# Patient Record
Sex: FEMALE | Race: WHITE | NOT HISPANIC OR LATINO | Employment: FULL TIME | URBAN - METROPOLITAN AREA
[De-identification: names, ages, dates, MRNs, and addresses within clinical notes are randomized per-mention and may not be internally consistent; named-entity substitution may affect disease eponyms.]

---

## 2022-01-21 ENCOUNTER — APPOINTMENT (OUTPATIENT)
Dept: WOMENS IMAGING | Facility: HOSPITAL | Age: 57
End: 2022-01-21

## 2022-01-21 PROCEDURE — 77067 SCR MAMMO BI INCL CAD: CPT | Performed by: RADIOLOGY

## 2022-08-15 RX ORDER — PAROXETINE HYDROCHLORIDE 20 MG/1
TABLET, FILM COATED ORAL
Qty: 270 TABLET | Refills: 0 | Status: SHIPPED | OUTPATIENT
Start: 2022-08-15 | End: 2022-12-07 | Stop reason: SDUPTHER

## 2022-10-25 ENCOUNTER — OFFICE VISIT (OUTPATIENT)
Dept: FAMILY MEDICINE CLINIC | Facility: CLINIC | Age: 57
End: 2022-10-25

## 2022-10-25 VITALS
DIASTOLIC BLOOD PRESSURE: 82 MMHG | BODY MASS INDEX: 35.56 KG/M2 | HEIGHT: 65 IN | SYSTOLIC BLOOD PRESSURE: 132 MMHG | HEART RATE: 89 BPM | WEIGHT: 213.4 LBS | OXYGEN SATURATION: 99 %

## 2022-10-25 DIAGNOSIS — R07.9 CHEST PAIN, UNSPECIFIED TYPE: Primary | ICD-10-CM

## 2022-10-25 PROCEDURE — 99213 OFFICE O/P EST LOW 20 MIN: CPT | Performed by: FAMILY MEDICINE

## 2022-10-25 RX ORDER — OMEPRAZOLE 40 MG/1
CAPSULE, DELAYED RELEASE ORAL
COMMUNITY
Start: 2022-08-15 | End: 2022-12-08

## 2022-10-25 RX ORDER — METHYLPREDNISOLONE 4 MG/1
TABLET ORAL
Qty: 19 TABLET | Refills: 0 | Status: SHIPPED | OUTPATIENT
Start: 2022-10-25 | End: 2022-11-04

## 2022-10-25 RX ORDER — FLUTICASONE PROPIONATE 50 MCG
SPRAY, SUSPENSION (ML) NASAL
COMMUNITY
Start: 2022-08-15 | End: 2022-12-07 | Stop reason: SDUPTHER

## 2022-10-25 NOTE — PROGRESS NOTES
"Chief Complaint  pain under armpit  (left)    Subjective          Margot Sanchez presents to [facility]  History of Present Illness  Patient reports has been having some pain under her left axilla from her anterior upper chest to her left axilla she says it comes and goes she says its unusual tingling burning type pain that comes and goes she says she presses and sometimes it hurts and other times it does not she says she does not know she had a mammogram done approximately 10 months ago was reported as normal she has noticed some nodularity of her breast tissue but no masses or thing particularly she was recently diagnosed with 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    Review of Systems   Constitutional: Negative.    HENT: Negative for congestion, dental problem, ear discharge, ear pain and sore throat.    Respiratory: Negative for apnea and shortness of breath.    Gastrointestinal: Negative for constipation and nausea.   Endocrine: Negative for polyuria.   Genitourinary: Negative for difficulty urinating.   Musculoskeletal: Positive for arthralgias and neck pain. Negative for gait problem.   Skin: Negative for rash.   Hematological: Negative for adenopathy.       Past History:  Medical History: has a past medical history of Depression, GERD (gastroesophageal reflux disease), Hypertension, Migraine, Pregnancy, and Tonsillitis.   Surgical History: has a past surgical history that includes Tubal ligation; Tonsillectomy; and Hysterectomy.         Current Outpatient Medications:   •  fluticasone (FLONASE) 50 MCG/ACT nasal spray, , Disp: , Rfl:   •  omeprazole (priLOSEC) 40 MG capsule, , Disp: , Rfl:   •  PARoxetine (PAXIL) 20 MG tablet, TAKE THREE TABLETS BY MOUTH DAILY, Disp: " 270 tablet, Rfl: 0    Allergies: Codeine and Penicillins    Physical Exam  Vitals reviewed.   Constitutional:       Appearance: Normal appearance.   HENT:      Head: Normocephalic and atraumatic.      Right Ear: Tympanic membrane, ear canal and external ear normal.      Left Ear: Tympanic membrane, ear canal and external ear normal.      Nose: Nose normal.      Mouth/Throat:      Mouth: Mucous membranes are moist.   Eyes:      Extraocular Movements: Extraocular movements intact.      Conjunctiva/sclera: Conjunctivae normal.      Pupils: Pupils are equal, round, and reactive to light.   Cardiovascular:      Rate and Rhythm: Normal rate and regular rhythm.   Pulmonary:      Effort: Pulmonary effort is normal.      Breath sounds: Normal breath sounds.   Chest:      Comments: Tenderness of the left axilla no focal focal masses or anything appreciated she also has some tenderness of the costochondral cartilages  Abdominal:      General: Abdomen is flat. Bowel sounds are normal.      Palpations: Abdomen is soft.   Musculoskeletal:         General: Normal range of motion.   Feet:      Right foot:      Protective Sensation: 0 sites tested. 0 sites sensed.      Toenail Condition: Right toenails are normal.      Left foot:      Protective Sensation: 0 sites tested. 0 sites sensed.      Toenail Condition: Left toenails are normal.   Skin:     General: Skin is warm and dry.   Neurological:      General: No focal deficit present.      Mental Status: She is alert and oriented to person, place, and time. Mental status is at baseline.   Psychiatric:         Behavior: Behavior normal.         Thought Content: Thought content normal.         Judgment: Judgment normal.          Result Review :                   Assessment and Plan    Diagnoses and all orders for this visit:    1. Chest pain, unspecified type (Primary)  Comments:  Reviewed mammogram also no masses appreciated it appears this is muscular we will try some Medrol Dosepak  and see how she does and monitor if worsens return     Other orders  -     methylPREDNISolone (MEDROL) 4 MG tablet; Take 3 tablets by mouth Daily for 3 days, THEN 2 tablets Daily for 3 days, THEN 1 tablet Daily for 4 days.  Dispense: 19 tablet; Refill: 0              Follow Up   No follow-ups on file.  Patient was given instructions and counseling regarding her condition or for health maintenance advice. Please see specific information pulled into the AVS if appropriate.     Matthew Corbett MD  Answers for HPI/ROS submitted by the patient on 10/23/2022  Please describe your symptoms.: Pain under arm in armpit area. Feels like knot under arm. Hurts when I lower arm to side. Cant tell in pain is armpit or side of breast.  Have you had these symptoms before?: No  How long have you been having these symptoms?: Greater than 2 weeks  Please list any medications you are currently taking for this condition.: None  Please describe any probable cause for these symptoms. : Unknown  What is the primary reason for your visit?: Other

## 2022-12-08 RX ORDER — OMEPRAZOLE 40 MG/1
CAPSULE, DELAYED RELEASE ORAL
Qty: 90 CAPSULE | Refills: 0 | Status: SHIPPED | OUTPATIENT
Start: 2022-12-08

## 2022-12-08 RX ORDER — OMEPRAZOLE 40 MG/1
40 CAPSULE, DELAYED RELEASE ORAL DAILY
Qty: 90 CAPSULE | Refills: 3 | Status: SHIPPED | OUTPATIENT
Start: 2022-12-08

## 2022-12-08 RX ORDER — FLUTICASONE PROPIONATE 50 MCG
2 SPRAY, SUSPENSION (ML) NASAL DAILY
Qty: 120 ML | Refills: 6 | Status: SHIPPED | OUTPATIENT
Start: 2022-12-08

## 2022-12-08 RX ORDER — PAROXETINE HYDROCHLORIDE 20 MG/1
60 TABLET, FILM COATED ORAL DAILY
Qty: 270 TABLET | Refills: 0 | Status: SHIPPED | OUTPATIENT
Start: 2022-12-08 | End: 2023-04-04 | Stop reason: SDUPTHER

## 2023-04-05 RX ORDER — PAROXETINE HYDROCHLORIDE 20 MG/1
60 TABLET, FILM COATED ORAL DAILY
Qty: 270 TABLET | Refills: 0 | Status: SHIPPED | OUTPATIENT
Start: 2023-04-05

## 2023-08-08 RX ORDER — PAROXETINE HYDROCHLORIDE 20 MG/1
60 TABLET, FILM COATED ORAL DAILY
Qty: 270 TABLET | Refills: 0 | OUTPATIENT
Start: 2023-08-08

## 2023-08-21 ENCOUNTER — OFFICE VISIT (OUTPATIENT)
Dept: FAMILY MEDICINE CLINIC | Facility: CLINIC | Age: 58
End: 2023-08-21
Payer: COMMERCIAL

## 2023-08-21 VITALS
OXYGEN SATURATION: 98 % | BODY MASS INDEX: 35.65 KG/M2 | HEIGHT: 65 IN | SYSTOLIC BLOOD PRESSURE: 112 MMHG | DIASTOLIC BLOOD PRESSURE: 80 MMHG | WEIGHT: 214 LBS

## 2023-08-21 DIAGNOSIS — E66.01 MORBID (SEVERE) OBESITY DUE TO EXCESS CALORIES: ICD-10-CM

## 2023-08-21 DIAGNOSIS — Z00.00 ROUTINE GENERAL MEDICAL EXAMINATION AT A HEALTH CARE FACILITY: Primary | ICD-10-CM

## 2023-08-21 DIAGNOSIS — F33.1 MAJOR DEPRESSIVE DISORDER, RECURRENT, MODERATE: ICD-10-CM

## 2023-08-21 DIAGNOSIS — Z13.220 LIPID SCREENING: ICD-10-CM

## 2023-08-21 DIAGNOSIS — K21.9 GASTROESOPHAGEAL REFLUX DISEASE WITHOUT ESOPHAGITIS: ICD-10-CM

## 2023-08-21 PROCEDURE — 36415 COLL VENOUS BLD VENIPUNCTURE: CPT | Performed by: FAMILY MEDICINE

## 2023-08-21 PROCEDURE — 99396 PREV VISIT EST AGE 40-64: CPT | Performed by: FAMILY MEDICINE

## 2023-08-21 RX ORDER — OMEPRAZOLE 40 MG/1
40 CAPSULE, DELAYED RELEASE ORAL
Qty: 90 CAPSULE | Refills: 2 | Status: SHIPPED | OUTPATIENT
Start: 2023-08-21

## 2023-08-21 RX ORDER — PAROXETINE HYDROCHLORIDE 20 MG/1
60 TABLET, FILM COATED ORAL DAILY
Qty: 270 TABLET | Refills: 1 | Status: SHIPPED | OUTPATIENT
Start: 2023-08-21

## 2023-08-21 NOTE — PROGRESS NOTES
"Chief Complaint  Annual Exam (Yearly physical )    Subjective          Margot Sanchez presents to [facility]  History of Present Illness  Patient comes in today to recheck her medications and basically also states that she is worried about her weight she says she is wants to discuss diet exercise activities and things she can do to try to help with that and also some medication she says that she also has some difficulties at times with gaining weight she says otherwise she has been doing relatively okay    Objective   Vital Signs:   /80   Ht 165.1 cm (65\")   Wt 97.1 kg (214 lb)   SpO2 98%   BMI 35.61 kg/mý     Body mass index is 35.61 kg/mý.    Review of Systems   Constitutional: Negative.    HENT:  Negative for congestion, dental problem, ear discharge, ear pain and sore throat.    Respiratory:  Negative for apnea, chest tightness and shortness of breath.    Gastrointestinal:  Negative for constipation and nausea.   Endocrine: Negative for polyuria.   Genitourinary:  Negative for difficulty urinating.   Musculoskeletal:  Negative for arthralgias and gait problem.   Skin:  Negative for rash.   Hematological:  Negative for adenopathy.   Psychiatric/Behavioral:  Positive for depressed mood. The patient is nervous/anxious.      Past History:  Medical History: has a past medical history of Depression, GERD (gastroesophageal reflux disease), Hypertension, Migraine, Pregnancy, and Tonsillitis.   Surgical History: has a past surgical history that includes Tubal ligation; Tonsillectomy; and Hysterectomy.         Current Outpatient Medications:     fluticasone (FLONASE) 50 MCG/ACT nasal spray, 2 sprays into the nostril(s) as directed by provider Daily., Disp: 120 mL, Rfl: 6    omeprazole (priLOSEC) 40 MG capsule, Take 1 capsule by mouth Every Morning Before Breakfast., Disp: 90 capsule, Rfl: 2    PARoxetine (PAXIL) 20 MG tablet, Take 3 tablets by mouth Daily., Disp: 270 tablet, Rfl: " 1    Allergies: Codeine and Penicillins    Physical Exam  Vitals reviewed.   Constitutional:       Appearance: Normal appearance. She is obese.   HENT:      Head: Normocephalic and atraumatic.      Right Ear: Tympanic membrane, ear canal and external ear normal.      Left Ear: Tympanic membrane, ear canal and external ear normal.      Nose: Nose normal.      Mouth/Throat:      Mouth: Mucous membranes are moist.   Eyes:      Extraocular Movements: Extraocular movements intact.      Conjunctiva/sclera: Conjunctivae normal.      Pupils: Pupils are equal, round, and reactive to light.   Cardiovascular:      Rate and Rhythm: Normal rate and regular rhythm.   Pulmonary:      Effort: Pulmonary effort is normal.      Breath sounds: Normal breath sounds.   Abdominal:      General: Abdomen is flat. Bowel sounds are normal.      Palpations: Abdomen is soft.   Musculoskeletal:         General: Normal range of motion.   Feet:      Right foot:      Protective Sensation: 0 sites tested.  0 sites sensed.      Toenail Condition: Right toenails are normal.      Left foot:      Protective Sensation: 0 sites tested.  0 sites sensed.      Toenail Condition: Left toenails are normal.   Skin:     General: Skin is warm and dry.   Neurological:      General: No focal deficit present.      Mental Status: She is alert and oriented to person, place, and time. Mental status is at baseline.   Psychiatric:         Behavior: Behavior normal.         Thought Content: Thought content normal.         Judgment: Judgment normal.        Result Review :                   Assessment and Plan    Diagnoses and all orders for this visit:    1. Routine general medical examination at a health care facility (Primary)  Comments:  Amado diet and exercise patient has gynecology appointment later this year and has mammograms already set up  Orders:  -     CBC & Differential; Future  -     Comprehensive Metabolic Panel; Future    2. Gastroesophageal reflux disease  without esophagitis  Comments:  Medications and monitor  Orders:  -     omeprazole (priLOSEC) 40 MG capsule; Take 1 capsule by mouth Every Morning Before Breakfast.  Dispense: 90 capsule; Refill: 2    3. Lipid screening  Comments:  We will check lipids  Orders:  -     Lipid Panel; Future    4. Major depressive disorder, recurrent, moderate  Comments:  Medications  Orders:  -     PARoxetine (PAXIL) 20 MG tablet; Take 3 tablets by mouth Daily.  Dispense: 270 tablet; Refill: 1    5. Morbid (severe) obesity due to excess calories  Comments:  Discussed diet and exercise activities and also possible medications            Updated annual wellness visit checklist.  Immunizations discussed.  Screening up-to-date.  Recommend yearly dental and eye exams. Also discussed monitoring of blood pressure and lipids.     Follow Up   No follow-ups on file.  Patient was given instructions and counseling regarding her condition or for health maintenance advice. Please see specific information pulled into the AVS if appropriate.     Jonah Sandoval submitted by the patient for this visit:  Primary Reason for Visit (Submitted on 8/20/2023)  What is the primary reason for your visit?: Physical

## 2023-08-22 LAB
SPECIMEN STATUS: NORMAL
SPECIMEN STATUS: NORMAL

## 2023-08-29 ENCOUNTER — TELEPHONE (OUTPATIENT)
Dept: FAMILY MEDICINE CLINIC | Facility: CLINIC | Age: 58
End: 2023-08-29

## 2023-08-29 NOTE — TELEPHONE ENCOUNTER
Caller: Margot Sanchez    Relationship: Self    Best call back number: [phone]    What test was performed: LAB     When was the test performed: 8/21    Where was the test performed: OFFICE    Additional notes:PT STATED THAT SHE  HAS NOT RECEIVED RESULTS AND STATED THAT SHE RECEIVED NOTICE THAT THERE WAS NO TESTING INDICATED.

## 2023-08-31 NOTE — TELEPHONE ENCOUNTER
Caller: Margot Sanchez    Relationship to patient: Self    Best call back number: [phone]     Patient is needing: PATIENT CALLED TO FOLLOW UP REGARDING QUESTION ABOUT HER LABS THAT SHE STATED SHE HAD DONE ON 8/21.      STATED SHE RECEIVED NOTICE THAT THERE WAS NO TESTING INDICATED.    PATIENT STATED SHE DID HER LAB DRAW ON THE DATE 8/21 SAME DAY AS HER APPOINTMENT WITH DOCTOR GUNNER.      STATED SHE WENT STRAIGHT UPSTAIRS TO HAVE IT DONE.    PATIENT STATED IF SHE NEEDS TO COME BACK FOR ANOTHER DRAW SHE DOES NOT WANT TO BE CHARGED.      PLEASE SEE PATIENT MESSAGE ON 8/25 TO.      PLEASE ADVISE

## 2024-06-11 ENCOUNTER — OFFICE VISIT (OUTPATIENT)
Dept: FAMILY MEDICINE CLINIC | Facility: CLINIC | Age: 59
End: 2024-06-11
Payer: COMMERCIAL

## 2024-06-11 VITALS
BODY MASS INDEX: 37.39 KG/M2 | DIASTOLIC BLOOD PRESSURE: 90 MMHG | HEART RATE: 90 BPM | SYSTOLIC BLOOD PRESSURE: 128 MMHG | HEIGHT: 65 IN | WEIGHT: 224.4 LBS | OXYGEN SATURATION: 97 %

## 2024-06-11 DIAGNOSIS — Z76.89 ENCOUNTER TO ESTABLISH CARE: Primary | ICD-10-CM

## 2024-06-11 DIAGNOSIS — Z13.220 LIPID SCREENING: ICD-10-CM

## 2024-06-11 DIAGNOSIS — K21.9 GASTROESOPHAGEAL REFLUX DISEASE WITHOUT ESOPHAGITIS: ICD-10-CM

## 2024-06-11 DIAGNOSIS — H65.03 BILATERAL ACUTE SEROUS OTITIS MEDIA, RECURRENCE NOT SPECIFIED: ICD-10-CM

## 2024-06-11 DIAGNOSIS — Z13.1 DIABETES MELLITUS SCREENING: ICD-10-CM

## 2024-06-11 DIAGNOSIS — F33.1 MAJOR DEPRESSIVE DISORDER, RECURRENT, MODERATE: ICD-10-CM

## 2024-06-11 DIAGNOSIS — E66.9 OBESITY (BMI 30-39.9): ICD-10-CM

## 2024-06-11 PROCEDURE — 99214 OFFICE O/P EST MOD 30 MIN: CPT | Performed by: FAMILY MEDICINE

## 2024-06-11 RX ORDER — OMEPRAZOLE 40 MG/1
40 CAPSULE, DELAYED RELEASE ORAL
Qty: 90 CAPSULE | Refills: 3 | Status: SHIPPED | OUTPATIENT
Start: 2024-06-11

## 2024-06-11 RX ORDER — ACETAMINOPHEN 160 MG
2000 TABLET,DISINTEGRATING ORAL DAILY
Qty: 90 CAPSULE | Refills: 3 | Status: SHIPPED | OUTPATIENT
Start: 2024-06-11

## 2024-06-11 RX ORDER — ACETAMINOPHEN 160 MG
2000 TABLET,DISINTEGRATING ORAL DAILY
COMMUNITY
End: 2024-06-11 | Stop reason: SDUPTHER

## 2024-06-11 RX ORDER — FLUTICASONE PROPIONATE 50 MCG
2 SPRAY, SUSPENSION (ML) NASAL DAILY
Qty: 16 G | Refills: 3 | Status: SHIPPED | OUTPATIENT
Start: 2024-06-11

## 2024-06-11 RX ORDER — PAROXETINE HYDROCHLORIDE 20 MG/1
60 TABLET, FILM COATED ORAL DAILY
Qty: 270 TABLET | Refills: 3 | Status: SHIPPED | OUTPATIENT
Start: 2024-06-11

## 2024-06-11 NOTE — PROGRESS NOTES
"     Follow Up Office Visit      Patient Name: Margot Sanchez  : 1965   MRN: [id number]     Chief Complaint:    Chief Complaint   Patient presents with    Establish Care    Earache     Left ear, was treated one month ago at urgent care. Still having some discomfort at times.       History of Present Illness: Margot Sanchez is a 58 y.o. female who is here today for follow up with chronic medical issues and to have her ears evaluated.  Patient complains of left ear discomfort.  She states both ears are popping sound.  She has had some issues with seasonal allergies.  Patient has questions regarding weight loss medications.    Subjective      Review of Systems:   Review of Systems   HENT:  Positive for congestion, ear discharge, ear pain, rhinorrhea and sinus pressure.        The following portions of the patient's history were reviewed and updated as appropriate: allergies, current medications, past family history, past medical history, past social history, past surgical history and problem list.    Medications:     Current Outpatient Medications:     Cholecalciferol (Vitamin D3) 50 MCG (2000 UT) capsule, Take 1 capsule by mouth Daily., Disp: 90 capsule, Rfl: 3    fluticasone (FLONASE) 50 MCG/ACT nasal spray, 2 sprays into the nostril(s) as directed by provider Daily., Disp: 16 g, Rfl: 3    omeprazole (priLOSEC) 40 MG capsule, Take 1 capsule by mouth Every Morning Before Breakfast., Disp: 90 capsule, Rfl: 3    PARoxetine (PAXIL) 20 MG tablet, Take 3 tablets by mouth Daily., Disp: 270 tablet, Rfl: 3    Allergies:   Allergies   Allergen Reactions    Codeine Anaphylaxis    Penicillins Unknown - High Severity, Anaphylaxis, Hives and Other (See Comments)       Objective     Physical Exam:  Vital Signs:   Vitals:    24 0758   BP: 128/90   BP Location: Left arm   Patient Position: Sitting   Cuff Size: Large Adult   Pulse: 90   SpO2: 97%   Weight: 102 kg (224 lb 6.4 oz)   Height: 165.1 cm (65\")     Body mass " index is 37.34 kg/m².   Facility age limit for growth %sakina is 20 years.    Physical Exam  Vitals and nursing note reviewed.   Constitutional:       General: She is not in acute distress.     Appearance: Normal appearance. She is not ill-appearing or toxic-appearing.   HENT:      Head: Normocephalic and atraumatic.      Right Ear: A middle ear effusion is present.      Left Ear: A middle ear effusion is present.      Nose: Mucosal edema present.   Cardiovascular:      Rate and Rhythm: Normal rate and regular rhythm.   Pulmonary:      Effort: Pulmonary effort is normal.      Breath sounds: Normal breath sounds.   Neurological:      Mental Status: She is alert.         Procedures    PHQ-9 Total Score: 0     Assessment / Plan      Assessment/Plan:   Assessment & Plan  Encounter to establish care  Will check routine labs.  Bilateral acute serous otitis media, recurrence not specified  Recommend antihistamines and Flonase.  Consider decongestant as well.  Follow-up if symptoms persist.  Gastroesophageal reflux disease without esophagitis  Stable.  Refill meds.  Major depressive disorder, recurrent, moderate  Stable.  Refill meds.  Lipid screening  Will check lipid profile.  Diabetes mellitus screening  Lately patient has had an elevated A1c in the past.  Will recheck today.  Obesity (BMI 30-39.9)  Consider GLP-1.  Offered weight loss clinic but patient declined at this time.  Patient is going to check with her insurance and see what her coverage is.    Orders Placed This Encounter   Procedures    Hemoglobin A1c    CBC Auto Differential    Comprehensive Metabolic Panel    Lipid Panel    TSH     New Medications Ordered This Visit   Medications    Cholecalciferol (Vitamin D3) 50 MCG ( UT) capsule     Sig: Take 1 capsule by mouth Daily.     Dispense:  90 capsule     Refill:  3    fluticasone (FLONASE) 50 MCG/ACT nasal spray     Si sprays into the nostril(s) as directed by provider Daily.     Dispense:  16 g      Refill:  3    omeprazole (priLOSEC) 40 MG capsule     Sig: Take 1 capsule by mouth Every Morning Before Breakfast.     Dispense:  90 capsule     Refill:  3    PARoxetine (PAXIL) 20 MG tablet     Sig: Take 3 tablets by mouth Daily.     Dispense:  270 tablet     Refill:  3                   Follow Up:   Return in about 6 months (around 12/11/2024).      ARSH Matta MD  [facility] [address] [place]

## 2024-06-12 ENCOUNTER — TELEPHONE (OUTPATIENT)
Dept: FAMILY MEDICINE CLINIC | Facility: CLINIC | Age: 59
End: 2024-06-12
Payer: COMMERCIAL

## 2024-06-12 LAB
ALBUMIN SERPL-MCNC: 4.2 G/DL (ref 3.8–4.9)
ALBUMIN/GLOB SERPL: 1.4 {RATIO}
ALP SERPL-CCNC: 95 IU/L (ref 44–121)
ALT SERPL-CCNC: 56 IU/L (ref 0–32)
AST SERPL-CCNC: 58 IU/L (ref 0–40)
BASOPHILS # BLD AUTO: 0.1 X10E3/UL (ref 0–0.2)
BASOPHILS NFR BLD AUTO: 1 %
BILIRUB SERPL-MCNC: 0.3 MG/DL (ref 0–1.2)
BUN SERPL-MCNC: 14 MG/DL (ref 6–24)
BUN/CREAT SERPL: 17 (ref 9–23)
CALCIUM SERPL-MCNC: 8.8 MG/DL (ref 8.7–10.2)
CHLORIDE SERPL-SCNC: 106 MMOL/L (ref 96–106)
CHOLEST SERPL-MCNC: 172 MG/DL (ref 100–199)
CO2 SERPL-SCNC: 19 MMOL/L (ref 20–29)
CREAT SERPL-MCNC: 0.83 MG/DL (ref 0.57–1)
EGFRCR SERPLBLD CKD-EPI 2021: 83 ML/MIN/1.73
EOSINOPHIL # BLD AUTO: 0.2 X10E3/UL (ref 0–0.4)
EOSINOPHIL NFR BLD AUTO: 2 %
ERYTHROCYTE [DISTWIDTH] IN BLOOD BY AUTOMATED COUNT: 15.7 % (ref 11.7–15.4)
GLOBULIN SER CALC-MCNC: 3 G/DL (ref 1.5–4.5)
GLUCOSE SERPL-MCNC: 118 MG/DL (ref 70–99)
HBA1C MFR BLD: 6.1 % (ref 4.8–5.6)
HCT VFR BLD AUTO: 39.3 % (ref 34–46.6)
HDLC SERPL-MCNC: 42 MG/DL
HGB BLD-MCNC: 12.7 G/DL (ref 11.1–15.9)
IMM GRANULOCYTES # BLD AUTO: 0.1 X10E3/UL (ref 0–0.1)
IMM GRANULOCYTES NFR BLD AUTO: 1 %
LDLC SERPL CALC-MCNC: 114 MG/DL (ref 0–99)
LYMPHOCYTES # BLD AUTO: 2.4 X10E3/UL (ref 0.7–3.1)
LYMPHOCYTES NFR BLD AUTO: 27 %
MCH RBC QN AUTO: 26.2 PG (ref 26.6–33)
MCHC RBC AUTO-ENTMCNC: 32.3 G/DL (ref 31.5–35.7)
MCV RBC AUTO: 81 FL (ref 79–97)
MONOCYTES # BLD AUTO: 0.6 X10E3/UL (ref 0.1–0.9)
MONOCYTES NFR BLD AUTO: 6 %
NEUTROPHILS # BLD AUTO: 5.7 X10E3/UL (ref 1.4–7)
NEUTROPHILS NFR BLD AUTO: 63 %
PLATELET # BLD AUTO: 283 X10E3/UL (ref 150–450)
POTASSIUM SERPL-SCNC: 4.7 MMOL/L (ref 3.5–5.2)
PROT SERPL-MCNC: 7.2 G/DL (ref 6–8.5)
RBC # BLD AUTO: 4.84 X10E6/UL (ref 3.77–5.28)
SODIUM SERPL-SCNC: 142 MMOL/L (ref 134–144)
TRIGL SERPL-MCNC: 88 MG/DL (ref 0–149)
TSH SERPL DL<=0.005 MIU/L-ACNC: 2.53 UIU/ML (ref 0.45–4.5)
VLDLC SERPL CALC-MCNC: 16 MG/DL (ref 5–40)
WBC # BLD AUTO: 9.1 X10E3/UL (ref 3.4–10.8)

## 2024-06-12 NOTE — TELEPHONE ENCOUNTER
Hub to relay There is evidence of insulin resistance/prediabetes on your blood work.  Liver enzymes are borderline elevated which is most likely secondary to fatty liver.  I recommend consideration of treatment of prediabetes with metformin.  We will need to continue to monitor your liver enzymes.  Hopefully they should improve as the insulin resistance improves.  If you are agreeable to starting metformin I can send to the pharmacy.

## 2024-12-11 ENCOUNTER — OFFICE VISIT (OUTPATIENT)
Dept: FAMILY MEDICINE CLINIC | Facility: CLINIC | Age: 59
End: 2024-12-11
Payer: COMMERCIAL

## 2024-12-11 VITALS
DIASTOLIC BLOOD PRESSURE: 86 MMHG | BODY MASS INDEX: 37.02 KG/M2 | HEIGHT: 65 IN | SYSTOLIC BLOOD PRESSURE: 128 MMHG | WEIGHT: 222.2 LBS | OXYGEN SATURATION: 96 % | HEART RATE: 89 BPM

## 2024-12-11 DIAGNOSIS — R10.12 LEFT UPPER QUADRANT PAIN: Primary | ICD-10-CM

## 2024-12-11 DIAGNOSIS — E66.9 OBESITY (BMI 30-39.9): ICD-10-CM

## 2024-12-11 DIAGNOSIS — E88.819 INSULIN RESISTANCE: ICD-10-CM

## 2024-12-11 DIAGNOSIS — K76.0 FATTY LIVER: ICD-10-CM

## 2024-12-11 PROCEDURE — 99214 OFFICE O/P EST MOD 30 MIN: CPT | Performed by: FAMILY MEDICINE

## 2024-12-11 NOTE — PROGRESS NOTES
Follow Up Office Visit      Patient Name: Margot Sanchez  : 1965   MRN: [id number]     Chief Complaint:    Chief Complaint   Patient presents with    Depression     Med check        History of Present Illness: Margot Sanchez is a 59 y.o. female who is here today for follow up with depression, labs, left upper quadrant abdominal pain.  Patient states in the past she had abdominal pain and went to the ER and was found to have spleen enlargement.  She states symptoms have returned and she feels this may be an issue again.  She complains of pain and tenderness in the left upper abdomen.  She does have mild chronic constipation.  She has questions regarding GLP-1-RA's for weight loss and management of her fatty liver.    Subjective      Review of Systems:   Review of Systems   Constitutional:  Positive for fatigue.   Gastrointestinal:  Positive for abdominal pain.       The following portions of the patient's history were reviewed and updated as appropriate: allergies, current medications, past family history, past medical history, past social history, past surgical history and problem list.    Medications:     Current Outpatient Medications:     Cholecalciferol (Vitamin D3) 50 MCG ( UT) capsule, Take 1 capsule by mouth Daily., Disp: 90 capsule, Rfl: 3    fluticasone (FLONASE) 50 MCG/ACT nasal spray, 2 sprays into the nostril(s) as directed by provider Daily., Disp: 16 g, Rfl: 3    omeprazole (priLOSEC) 40 MG capsule, Take 1 capsule by mouth Every Morning Before Breakfast., Disp: 90 capsule, Rfl: 3    PARoxetine (PAXIL) 20 MG tablet, Take 3 tablets by mouth Daily., Disp: 270 tablet, Rfl: 3    Tirzepatide-Weight Management (ZEPBOUND) 2.5 MG/0.5ML solution auto-injector, Inject 0.5 mL under the skin into the appropriate area as directed 1 (One) Time Per Week., Disp: 2 mL, Rfl: 0    Allergies:   Allergies   Allergen Reactions    Codeine Anaphylaxis    Penicillins Unknown - High Severity, Anaphylaxis,  "Hives and Other (See Comments)       Objective     Physical Exam:  Vital Signs:   Vitals:    12/11/24 0818   BP: 128/86   BP Location: Left arm   Patient Position: Sitting   Cuff Size: Large Adult   Pulse: 89   SpO2: 96%   Weight: 101 kg (222 lb 3.2 oz)   Height: 165.1 cm (65\")     Body mass index is 36.98 kg/m².   Facility age limit for growth %sakina is 20 years.    Physical Exam  Vitals and nursing note reviewed.   Constitutional:       General: She is not in acute distress.     Appearance: Normal appearance. She is not ill-appearing or toxic-appearing.   HENT:      Head: Normocephalic and atraumatic.   Cardiovascular:      Rate and Rhythm: Normal rate.   Pulmonary:      Effort: Pulmonary effort is normal.   Abdominal:      Tenderness: There is abdominal tenderness. There is no guarding or rebound.       Neurological:      Mental Status: She is alert.         Procedures    PHQ-9 Total Score:      Assessment / Plan      Assessment/Plan:   Assessment & Plan  Left upper quadrant pain    Orders:    CBC Auto Differential; Future    Comprehensive Metabolic Panel; Future    Amylase; Future    Lipase; Future    CT Abdomen Pelvis With & Without Contrast; Future    Lipase    Amylase    Comprehensive Metabolic Panel    CBC Auto Differential    Insulin resistance  See below.  Orders:    Hemoglobin A1c; Future    Hemoglobin A1c    Obesity (BMI 30-39.9)  Discussed risk and benefits of tirzepatide and titration regimen.  I recommend patient not start this medication until we obtain her lab results from today.  Orders:    Tirzepatide-Weight Management (ZEPBOUND) 2.5 MG/0.5ML solution auto-injector; Inject 0.5 mL under the skin into the appropriate area as directed 1 (One) Time Per Week.    Fatty liver  As above.  I suspect initiating GLP 1-RA should help with this issue.                     Follow Up:   Return in about 3 months (around 3/11/2025) for Recheck.      ARSH Matta MD  [facility]  "

## 2024-12-12 LAB
ALBUMIN SERPL-MCNC: 4 G/DL (ref 3.8–4.9)
ALP SERPL-CCNC: 94 IU/L (ref 44–121)
ALT SERPL-CCNC: 49 IU/L (ref 0–32)
AMYLASE SERPL-CCNC: 23 U/L (ref 31–110)
AST SERPL-CCNC: 48 IU/L (ref 0–40)
BASOPHILS # BLD AUTO: 0 X10E3/UL (ref 0–0.2)
BASOPHILS NFR BLD AUTO: 1 %
BILIRUB SERPL-MCNC: 0.4 MG/DL (ref 0–1.2)
BUN SERPL-MCNC: 12 MG/DL (ref 6–24)
BUN/CREAT SERPL: 14 (ref 9–23)
CALCIUM SERPL-MCNC: 8.6 MG/DL (ref 8.7–10.2)
CHLORIDE SERPL-SCNC: 107 MMOL/L (ref 96–106)
CO2 SERPL-SCNC: 22 MMOL/L (ref 20–29)
CREAT SERPL-MCNC: 0.87 MG/DL (ref 0.57–1)
EGFRCR SERPLBLD CKD-EPI 2021: 78 ML/MIN/1.73
EOSINOPHIL # BLD AUTO: 0.2 X10E3/UL (ref 0–0.4)
EOSINOPHIL NFR BLD AUTO: 2 %
ERYTHROCYTE [DISTWIDTH] IN BLOOD BY AUTOMATED COUNT: 15.1 % (ref 11.7–15.4)
GLOBULIN SER CALC-MCNC: 3 G/DL (ref 1.5–4.5)
GLUCOSE SERPL-MCNC: 120 MG/DL (ref 70–99)
HBA1C MFR BLD: 6.6 % (ref 4.8–5.6)
HCT VFR BLD AUTO: 38.7 % (ref 34–46.6)
HGB BLD-MCNC: 12.5 G/DL (ref 11.1–15.9)
IMM GRANULOCYTES # BLD AUTO: 0 X10E3/UL (ref 0–0.1)
IMM GRANULOCYTES NFR BLD AUTO: 1 %
LIPASE SERPL-CCNC: 25 U/L (ref 14–72)
LYMPHOCYTES # BLD AUTO: 2.3 X10E3/UL (ref 0.7–3.1)
LYMPHOCYTES NFR BLD AUTO: 30 %
MCH RBC QN AUTO: 26.2 PG (ref 26.6–33)
MCHC RBC AUTO-ENTMCNC: 32.3 G/DL (ref 31.5–35.7)
MCV RBC AUTO: 81 FL (ref 79–97)
MONOCYTES # BLD AUTO: 0.4 X10E3/UL (ref 0.1–0.9)
MONOCYTES NFR BLD AUTO: 6 %
NEUTROPHILS # BLD AUTO: 4.8 X10E3/UL (ref 1.4–7)
NEUTROPHILS NFR BLD AUTO: 60 %
PLATELET # BLD AUTO: 254 X10E3/UL (ref 150–450)
POTASSIUM SERPL-SCNC: 4.5 MMOL/L (ref 3.5–5.2)
PROT SERPL-MCNC: 7 G/DL (ref 6–8.5)
RBC # BLD AUTO: 4.78 X10E6/UL (ref 3.77–5.28)
SODIUM SERPL-SCNC: 143 MMOL/L (ref 134–144)
WBC # BLD AUTO: 7.8 X10E3/UL (ref 3.4–10.8)

## 2024-12-17 DIAGNOSIS — R10.12 LEFT UPPER QUADRANT PAIN: Primary | ICD-10-CM

## 2025-01-02 ENCOUNTER — HOSPITAL ENCOUNTER (OUTPATIENT)
Dept: ULTRASOUND IMAGING | Facility: HOSPITAL | Age: 60
Discharge: HOME | End: 2025-01-02
Admitting: FAMILY MEDICINE
Payer: COMMERCIAL

## 2025-01-02 DIAGNOSIS — R10.12 LEFT UPPER QUADRANT PAIN: ICD-10-CM

## 2025-01-02 PROCEDURE — 76700 US EXAM ABDOM COMPLETE: CPT

## 2025-01-24 ENCOUNTER — OFFICE VISIT (OUTPATIENT)
Dept: FAMILY MEDICINE CLINIC | Facility: CLINIC | Age: 60
End: 2025-01-24
Payer: COMMERCIAL

## 2025-01-24 VITALS
HEIGHT: 65 IN | SYSTOLIC BLOOD PRESSURE: 130 MMHG | HEART RATE: 92 BPM | DIASTOLIC BLOOD PRESSURE: 80 MMHG | BODY MASS INDEX: 36.49 KG/M2 | WEIGHT: 219 LBS | OXYGEN SATURATION: 100 %

## 2025-01-24 DIAGNOSIS — M54.12 CERVICAL RADICULAR PAIN: Primary | ICD-10-CM

## 2025-01-24 RX ORDER — TRIAMCINOLONE ACETONIDE 40 MG/ML
80 INJECTION, SUSPENSION INTRA-ARTICULAR; INTRAMUSCULAR ONCE
Status: COMPLETED | OUTPATIENT
Start: 2025-01-24 | End: 2025-01-24

## 2025-01-24 RX ORDER — METHOCARBAMOL 500 MG/1
500 TABLET, FILM COATED ORAL 3 TIMES DAILY
Qty: 90 TABLET | Refills: 0 | Status: SHIPPED | OUTPATIENT
Start: 2025-01-24

## 2025-01-24 RX ORDER — KETOROLAC TROMETHAMINE 30 MG/ML
60 INJECTION, SOLUTION INTRAMUSCULAR; INTRAVENOUS ONCE
Status: COMPLETED | OUTPATIENT
Start: 2025-01-24 | End: 2025-01-24

## 2025-01-24 RX ADMIN — KETOROLAC TROMETHAMINE 60 MG: 30 INJECTION, SOLUTION INTRAMUSCULAR; INTRAVENOUS at 14:33

## 2025-01-24 RX ADMIN — TRIAMCINOLONE ACETONIDE 80 MG: 40 INJECTION, SUSPENSION INTRA-ARTICULAR; INTRAMUSCULAR at 14:32

## 2025-01-24 NOTE — PROGRESS NOTES
Office Note     Name: Margot Sanchez    : 1965     MRN: [id number]     Chief Complaint  Neck Pain    Subjective   Patient or patient representative verbalized consent for the use of Ambient Listening during the visit with  Jacinda Ruvalcaba PA-C for chart documentation. 2025  13:54 GAGAN Sanchez is a 59 y.o. female.     The patient presents for evaluation of neck pain.    She has been under chiropractic care for a suspected pinched nerve in her neck, with no improvement. The chiropractor suspects a disc issue, given her history of a bulging disc in her lower back. She reports severe radiating pain down her right arm, with no weakness, and has been absent from work for 2 days. The pain, initially attributed to arthritis, has escalated, limiting her neck movement. Chiropractic treatments provide temporary relief, but mobility is limited due to swelling. She has reflux but no history of stomach ulcers. She previously received a Toradol injection in her hip at this office. Ice packs provide some relief. She is considering another MRI and inquires if it can be arranged here or if she needs an orthopedic consult. She reports no recent injuries but recalls a fall last year and a recent incident stepping on ice, which may have triggered her symptoms. She experiences constant back pain and tightness. She has one remaining muscle relaxer from a previous prescription and plans to take it tonight. She is familiar with Flexeril and open to trying any medication that might help. She is eager to return to work after her 2-day absence.    Review of Systems:   Review of Systems   Constitutional:  Negative for chills, diaphoresis, fatigue and fever.   HENT:  Negative for congestion.    Respiratory:  Negative for cough.    Cardiovascular:  Negative for chest pain.   Gastrointestinal:  Negative for abdominal pain and nausea.   Musculoskeletal:  Positive for myalgias and neck pain.    Neurological:  Positive for numbness.       Past Medical History:   Past Medical History:   Diagnosis Date    Allergic     Arthritis     Depression     GERD (gastroesophageal reflux disease)     HL (hearing loss)     Hypertension     Irritable bowel syndrome     Migraine     Obesity     Osteopenia     Pregnancy     2    Tonsillitis        Past Surgical History:   Past Surgical History:   Procedure Laterality Date    CHOLECYSTECTOMY      COLONOSCOPY      FRACTURE SURGERY      HYSTERECTOMY      SINUS SURGERY      TONSILLECTOMY      TUBAL ABDOMINAL LIGATION         Family History:   Family History   Problem Relation Age of Onset    Depression Mother     Hypertension Mother     Asthma Mother     Migraines Mother     Anxiety disorder Mother     Arthritis Mother     Cancer Mother     COPD Mother     Migraines Father     Hypertension Father     Diabetes Father     Obesity Brother     Depression Brother     Hypertension Brother     Migraines Brother     Anxiety disorder Brother     Asthma Son     Migraines Son     Cancer Paternal Grandfather     Kidney disease Paternal Grandmother     Cancer Maternal Aunt     Cancer Maternal Uncle        Social History:   Social History     Socioeconomic History    Marital status:    Tobacco Use    Smoking status: Never    Smokeless tobacco: Never   Vaping Use    Vaping status: Never Used   Substance and Sexual Activity    Alcohol use: Yes     Comment: Analy socially 1 every week or 2    Drug use: Never    Sexual activity: Yes     Partners: Male     Birth control/protection: Tubal ligation, Hysterectomy       Immunizations:   Immunization History   Administered Date(s) Administered    Fluzone  >6mos 10/23/2024    Tdap 11/02/2015        Medications:     Current Outpatient Medications:     Cholecalciferol (Vitamin D3) 50 MCG (2000 UT) capsule, Take 1 capsule by mouth Daily., Disp: 90 capsule, Rfl: 3    fluticasone (FLONASE) 50 MCG/ACT nasal spray, 2 sprays into the nostril(s) as  "directed by provider Daily., Disp: 16 g, Rfl: 3    omeprazole (priLOSEC) 40 MG capsule, Take 1 capsule by mouth Every Morning Before Breakfast., Disp: 90 capsule, Rfl: 3    PARoxetine (PAXIL) 20 MG tablet, Take 3 tablets by mouth Daily., Disp: 270 tablet, Rfl: 3    Tirzepatide-Weight Management (ZEPBOUND) 2.5 MG/0.5ML solution auto-injector, Inject 0.5 mL under the skin into the appropriate area as directed 1 (One) Time Per Week., Disp: 2 mL, Rfl: 0    methocarbamol (ROBAXIN) 500 MG tablet, Take 1 tablet by mouth 3 (Three) Times a Day., Disp: 90 tablet, Rfl: 0    Current Facility-Administered Medications:     ketorolac (TORADOL) injection 60 mg, 60 mg, Intramuscular, Once, Jacinda Ruvalcaba PA-C    triamcinolone acetonide (KENALOG-40) injection 80 mg, 80 mg, Intramuscular, Once, Jacinda Ruvalcaba PA-C    Allergies:   Allergies   Allergen Reactions    Codeine Anaphylaxis    Penicillins Unknown - High Severity, Anaphylaxis, Hives and Other (See Comments)       Objective     Vital Signs  /80   Pulse 92   Ht 165.1 cm (65\")   Wt 99.3 kg (219 lb)   SpO2 100%   BMI 36.44 kg/m²   Estimated body mass index is 36.44 kg/m² as calculated from the following:    Height as of this encounter: 165.1 cm (65\").    Weight as of this encounter: 99.3 kg (219 lb).            Physical Exam  Vitals and nursing note reviewed.   Constitutional:       General: She is not in acute distress.     Appearance: Normal appearance. She is not ill-appearing.   HENT:      Head: Normocephalic and atraumatic.   Cardiovascular:      Rate and Rhythm: Normal rate and regular rhythm.      Pulses: Normal pulses.      Heart sounds: Normal heart sounds.   Pulmonary:      Effort: Pulmonary effort is normal. No respiratory distress.      Breath sounds: Normal breath sounds.   Musculoskeletal:      Cervical back: Spasms and tenderness present. No swelling or deformity. Pain with movement present.      Right lower leg: No edema. "      Left lower leg: No edema.   Skin:     General: Skin is warm and dry.   Neurological:      General: No focal deficit present.      Mental Status: She is alert and oriented to person, place, and time.      Coordination: Coordination normal.      Gait: Gait normal.   Psychiatric:         Mood and Affect: Mood normal.         Behavior: Behavior normal.          Results:  No results found for this or any previous visit (from the past 24 hours).     Assessment and Plan     Diagnoses and all orders for this visit:    1. Cervical radicular pain (Primary)  Assessment & Plan:  Reports severe radiating arm pain, likely due to a pinched nerve in her neck, exacerbated by inflammation. Administered Toradol and Kenalog injections today. Prescribed methocarbamol for muscle relaxation. Ordered x-ray to investigate symptoms. Advised to avoid operating heavy machinery or driving when taking methocarbamol due to potential drowsiness.    Orders:  -     ketorolac (TORADOL) injection 60 mg  -     XR Spine Cervical 2 or 3 View; Future  -     triamcinolone acetonide (KENALOG-40) injection 80 mg  -     methocarbamol (ROBAXIN) 500 MG tablet; Take 1 tablet by mouth 3 (Three) Times a Day.  Dispense: 90 tablet; Refill: 0        Follow Up  Return if symptoms worsen or fail to improve.    Jacinda Ruvalcaba PA-C  [facility]

## 2025-01-24 NOTE — ASSESSMENT & PLAN NOTE
Reports severe radiating arm pain, likely due to a pinched nerve in her neck, exacerbated by inflammation. Administered Toradol and Kenalog injections today. Prescribed methocarbamol for muscle relaxation. Ordered x-ray to investigate symptoms. Advised to avoid operating heavy machinery or driving when taking methocarbamol due to potential drowsiness.

## 2025-01-29 DIAGNOSIS — E66.9 OBESITY (BMI 30-39.9): ICD-10-CM

## 2025-03-01 ENCOUNTER — PATIENT MESSAGE (OUTPATIENT)
Dept: FAMILY MEDICINE CLINIC | Facility: CLINIC | Age: 60
End: 2025-03-01
Payer: COMMERCIAL

## 2025-03-11 ENCOUNTER — OFFICE VISIT (OUTPATIENT)
Dept: FAMILY MEDICINE CLINIC | Facility: CLINIC | Age: 60
End: 2025-03-11
Payer: COMMERCIAL

## 2025-03-11 VITALS
HEIGHT: 65 IN | HEART RATE: 89 BPM | TEMPERATURE: 98 F | DIASTOLIC BLOOD PRESSURE: 72 MMHG | WEIGHT: 192.3 LBS | OXYGEN SATURATION: 97 % | SYSTOLIC BLOOD PRESSURE: 118 MMHG | BODY MASS INDEX: 32.04 KG/M2 | RESPIRATION RATE: 18 BRPM

## 2025-03-11 DIAGNOSIS — K21.9 GASTROESOPHAGEAL REFLUX DISEASE WITHOUT ESOPHAGITIS: ICD-10-CM

## 2025-03-11 DIAGNOSIS — F33.1 MAJOR DEPRESSIVE DISORDER, RECURRENT, MODERATE: ICD-10-CM

## 2025-03-11 DIAGNOSIS — E11.9 TYPE 2 DIABETES MELLITUS WITHOUT COMPLICATION, WITHOUT LONG-TERM CURRENT USE OF INSULIN: Primary | ICD-10-CM

## 2025-03-11 LAB
EXPIRATION DATE: NORMAL
HBA1C MFR BLD: 5.2 % (ref 4.5–5.7)
Lab: NORMAL

## 2025-03-11 RX ORDER — OMEPRAZOLE 40 MG/1
40 CAPSULE, DELAYED RELEASE ORAL
Qty: 90 CAPSULE | Refills: 3 | Status: SHIPPED | OUTPATIENT
Start: 2025-03-11

## 2025-03-11 RX ORDER — PAROXETINE 20 MG/1
60 TABLET, FILM COATED ORAL DAILY
Qty: 270 TABLET | Refills: 3 | Status: SHIPPED | OUTPATIENT
Start: 2025-03-11

## 2025-03-11 RX ORDER — FLUTICASONE PROPIONATE 50 MCG
2 SPRAY, SUSPENSION (ML) NASAL DAILY
Qty: 16 G | Refills: 3 | Status: SHIPPED | OUTPATIENT
Start: 2025-03-11

## 2025-03-11 NOTE — PROGRESS NOTES
Follow Up Office Visit      Patient Name: Margot Sanchez  : 1965   MRN: [id number]     Chief Complaint:    Chief Complaint   Patient presents with    Weight Check     Tolerating zepbound 5mg/0.5ml-Pt has been fasting        History of Present Illness: Margot Sanchez is a 59 y.o. female who is here today for follow up with neck pain.  Patient states she has lost 31 pounds.  She states she is feeling much better and snoring less.  She needs refills of other medications.  She states her depression is stable.  No complaints today.    Subjective      Review of Systems:   Review of Systems   Constitutional:  Positive for fatigue. Negative for chills, diaphoresis and fever.   HENT:  Positive for congestion. Negative for sore throat and swollen glands.    Respiratory:  Negative for cough.    Cardiovascular:  Negative for chest pain.   Gastrointestinal:  Negative for abdominal pain, nausea and vomiting.   Genitourinary:  Negative for dysuria.   Musculoskeletal:  Positive for myalgias and neck pain.   Skin:  Negative for rash.   Neurological:  Positive for numbness. Negative for weakness.       The following portions of the patient's history were reviewed and updated as appropriate: allergies, current medications, past family history, past medical history, past social history, past surgical history and problem list.    Medications:     Current Outpatient Medications:     Cholecalciferol (Vitamin D3) 50 MCG (2000 UT) capsule, Take 1 capsule by mouth Daily., Disp: 90 capsule, Rfl: 3    fluticasone (FLONASE) 50 MCG/ACT nasal spray, Administer 2 sprays into the nostril(s) as directed by provider Daily., Disp: 16 g, Rfl: 3    omeprazole (priLOSEC) 40 MG capsule, Take 1 capsule by mouth Every Morning Before Breakfast., Disp: 90 capsule, Rfl: 3    PARoxetine (PAXIL) 20 MG tablet, Take 3 tablets by mouth Daily., Disp: 270 tablet, Rfl: 3    Tirzepatide-Weight Management (ZEPBOUND) 5 MG/0.5ML solution  "auto-injector, Inject 0.5 mL under the skin into the appropriate area as directed 1 (One) Time Per Week., Disp: 2 mL, Rfl: 0    Allergies:   Allergies   Allergen Reactions    Codeine Anaphylaxis    Penicillins Unknown - High Severity, Anaphylaxis, Hives and Other (See Comments)       Objective     Physical Exam:  Vital Signs:   Vitals:    03/11/25 0802   BP: 118/72   BP Location: Left arm   Patient Position: Sitting   Cuff Size: Adult   Pulse: 89   Resp: 18   Temp: 98 °F (36.7 °C)   SpO2: 97%   Weight: 87.2 kg (192 lb 4.8 oz)   Height: 165.1 cm (65\")   PainSc: 0-No pain     Body mass index is 32 kg/m².   Facility age limit for growth %sakina is 20 years.    Physical Exam  Vitals and nursing note reviewed.   Constitutional:       Appearance: Normal appearance.   Cardiovascular:      Rate and Rhythm: Normal rate.   Pulmonary:      Effort: Pulmonary effort is normal.   Neurological:      Mental Status: She is alert.   Psychiatric:         Mood and Affect: Mood normal.         Procedures    PHQ-9 Total Score:      Assessment / Plan      Assessment/Plan:   Assessment & Plan  Type 2 diabetes mellitus without complication, without long-term current use of insulin  A1c has dropped from 6.6 down to 5.2.  31 pound weight loss.  Patient is tolerating the GLP-1 without issue.  Will plan to do annual physical next visit along with routine labs.    Orders:    POC Glycosylated Hemoglobin (Hb A1C)    Major depressive disorder, recurrent, moderate  Stable.  No complaints.  Will refill medication.    Orders:    PARoxetine (PAXIL) 20 MG tablet; Take 3 tablets by mouth Daily.    Gastroesophageal reflux disease without esophagitis  Stable.  No issues while on GLP-1.  Will refill medication today.  Orders:    omeprazole (priLOSEC) 40 MG capsule; Take 1 capsule by mouth Every Morning Before Breakfast.                  Follow Up:   Return in about 3 months (around 6/11/2025) for Annual physical.      ARSH Matta MD  [facility] [place]  Answers submitted by the patient for this visit:  Problem not listed (Submitted on 3/4/2025)  Chief Complaint: Other medical problem  Reason for appointment: A1C bloodwork & weight  management  anorexia: Yes  joint pain: Yes  change in stool: No  headaches: Yes  joint swelling: No  vertigo: No  visual change: No  Onset: at an unknown time  Chronicity: recurrent  Frequency: constantly

## 2025-03-28 RX ORDER — TIRZEPATIDE 5 MG/.5ML
INJECTION, SOLUTION SUBCUTANEOUS
Qty: 2 ML | Refills: 1 | Status: SHIPPED | OUTPATIENT
Start: 2025-03-28

## 2025-04-01 ENCOUNTER — TELEPHONE (OUTPATIENT)
Dept: FAMILY MEDICINE CLINIC | Facility: CLINIC | Age: 60
End: 2025-04-01
Payer: COMMERCIAL

## 2025-05-07 DIAGNOSIS — E66.9 OBESITY (BMI 30-39.9): Primary | ICD-10-CM

## 2025-05-07 RX ORDER — SEMAGLUTIDE 0.5 MG/.5ML
0.5 INJECTION, SOLUTION SUBCUTANEOUS WEEKLY
Qty: 2 ML | Refills: 0 | Status: SHIPPED | OUTPATIENT
Start: 2025-05-07

## 2025-05-08 ENCOUNTER — TELEPHONE (OUTPATIENT)
Dept: FAMILY MEDICINE CLINIC | Facility: CLINIC | Age: 60
End: 2025-05-08
Payer: COMMERCIAL

## 2025-06-26 ENCOUNTER — TELEPHONE (OUTPATIENT)
Dept: FAMILY MEDICINE CLINIC | Facility: CLINIC | Age: 60
End: 2025-06-26
Payer: COMMERCIAL

## 2025-06-26 DIAGNOSIS — E66.9 OBESITY (BMI 30-39.9): ICD-10-CM

## 2025-06-26 RX ORDER — SEMAGLUTIDE 0.5 MG/.5ML
0.5 INJECTION, SOLUTION SUBCUTANEOUS WEEKLY
Qty: 2 ML | Refills: 0 | Status: SHIPPED | OUTPATIENT
Start: 2025-06-26

## 2025-06-26 NOTE — TELEPHONE ENCOUNTER
"    Caller: Margot Sanchez Samples \"[place]\"    Relationship: Self    Best call back number:   Telephone Information:   Mobile [phone]        What medication are you requesting: Semaglutide-Weight Management (Wegovy)       Have you had these symptoms before:    [x] Yes  [] No    Have you been treated for these symptoms before:   [x] Yes  [] No    If a prescription is needed, what is your preferred pharmacy and phone number: [facility] [id number] [id number] Replaced by [facility] [address] - [phone] [facility] [phone] FX     Additional notes: PATIENT WOULD LIKE TO INCREASE THE DOSAGE OF THIS MEDICATION      "

## 2025-07-08 ENCOUNTER — OFFICE VISIT (OUTPATIENT)
Dept: FAMILY MEDICINE CLINIC | Facility: CLINIC | Age: 60
End: 2025-07-08
Payer: COMMERCIAL

## 2025-07-08 VITALS
OXYGEN SATURATION: 98 % | WEIGHT: 178 LBS | DIASTOLIC BLOOD PRESSURE: 72 MMHG | SYSTOLIC BLOOD PRESSURE: 116 MMHG | HEIGHT: 65 IN | HEART RATE: 74 BPM | BODY MASS INDEX: 29.66 KG/M2

## 2025-07-08 DIAGNOSIS — F33.1 MAJOR DEPRESSIVE DISORDER, RECURRENT, MODERATE: ICD-10-CM

## 2025-07-08 DIAGNOSIS — Z13.29 THYROID DISORDER SCREEN: ICD-10-CM

## 2025-07-08 DIAGNOSIS — Z23 NEED FOR STREPTOCOCCUS PNEUMONIAE VACCINATION: ICD-10-CM

## 2025-07-08 DIAGNOSIS — E11.9 TYPE 2 DIABETES MELLITUS WITHOUT COMPLICATION, WITHOUT LONG-TERM CURRENT USE OF INSULIN: Primary | ICD-10-CM

## 2025-07-08 DIAGNOSIS — K21.9 GASTROESOPHAGEAL REFLUX DISEASE WITHOUT ESOPHAGITIS: ICD-10-CM

## 2025-07-08 DIAGNOSIS — H65.01 RIGHT ACUTE SEROUS OTITIS MEDIA, RECURRENCE NOT SPECIFIED: ICD-10-CM

## 2025-07-08 DIAGNOSIS — R22.1 NECK SWELLING: ICD-10-CM

## 2025-07-08 DIAGNOSIS — Z00.00 ANNUAL PHYSICAL EXAM: ICD-10-CM

## 2025-07-08 LAB
EXPIRATION DATE: NORMAL
Lab: NORMAL
POC ALBUMIN, URINE: 10 MG/L
POC CREATININE, URINE: 300 MG/DL
POC URINE ALB/CREA RATIO: <30

## 2025-07-08 PROCEDURE — 90684 PCV21 VACCINE IM: CPT | Performed by: FAMILY MEDICINE

## 2025-07-08 PROCEDURE — 90471 IMMUNIZATION ADMIN: CPT | Performed by: FAMILY MEDICINE

## 2025-07-08 PROCEDURE — 99396 PREV VISIT EST AGE 40-64: CPT | Performed by: FAMILY MEDICINE

## 2025-07-08 PROCEDURE — 82044 UR ALBUMIN SEMIQUANTITATIVE: CPT | Performed by: FAMILY MEDICINE

## 2025-07-08 PROCEDURE — 82570 ASSAY OF URINE CREATININE: CPT | Performed by: FAMILY MEDICINE

## 2025-07-08 RX ORDER — FLUTICASONE PROPIONATE 50 MCG
2 SPRAY, SUSPENSION (ML) NASAL DAILY
Qty: 16 G | Refills: 3 | Status: SHIPPED | OUTPATIENT
Start: 2025-07-08

## 2025-07-08 RX ORDER — OMEPRAZOLE 40 MG/1
40 CAPSULE, DELAYED RELEASE ORAL
Qty: 90 CAPSULE | Refills: 3 | Status: SHIPPED | OUTPATIENT
Start: 2025-07-08

## 2025-07-08 RX ORDER — ACETAMINOPHEN 160 MG
2000 TABLET,DISINTEGRATING ORAL DAILY
Qty: 90 CAPSULE | Refills: 3 | Status: SHIPPED | OUTPATIENT
Start: 2025-07-08

## 2025-07-08 RX ORDER — PAROXETINE 20 MG/1
60 TABLET, FILM COATED ORAL DAILY
Qty: 270 TABLET | Refills: 3 | Status: SHIPPED | OUTPATIENT
Start: 2025-07-08

## 2025-07-08 NOTE — PROGRESS NOTES
"     Female Physical Note      Date: 2025   Patient Name: Margot Sanchez  : 1965   MRN: [id number]     Chief Complaint:    Chief Complaint   Patient presents with    Annual Exam     Wants to increase wegovy and she is concerned about the swelling in her neck possible thyroid. Also drainage out of right ear.       History of Present Illness: Margot Sanchez is a 59 y.o. female who is here today for their annual health maintenance and physical.  Patient has been having some right ear congestion and drainage she would like to have it evaluated.  She does report increased nasal congestion.  Also a coworker is concerned about a \"swelling\" of the neck and wants to have this evaluated.  Patient feels as if this is secondary to recent significant weight loss.  She denies any other complaints and states she is feeling very well on her GLP-1.  She is currently on semaglutide but was doing better on tirzepatide.      Subjective      Review of Systems:   Review of Systems   All other systems reviewed and are negative.      Past Medical History, Social History, Family History and Care Team were all reviewed with patient and updated as appropriate.     Medications:     Current Outpatient Medications:     Cholecalciferol (Vitamin D3) 50 MCG ( UT) capsule, Take 1 capsule by mouth Daily., Disp: 90 capsule, Rfl: 3    fluticasone (FLONASE) 50 MCG/ACT nasal spray, Administer 2 sprays into the nostril(s) as directed by provider Daily., Disp: 16 g, Rfl: 3    omeprazole (priLOSEC) 40 MG capsule, Take 1 capsule by mouth Every Morning Before Breakfast., Disp: 90 capsule, Rfl: 3    PARoxetine (PAXIL) 20 MG tablet, Take 3 tablets by mouth Daily., Disp: 270 tablet, Rfl: 3    Semaglutide-Weight Management (Wegovy) 0.5 MG/0.5ML solution auto-injector, Inject 0.5 mL under the skin into the appropriate area as directed 1 (One) Time Per Week., Disp: 2 mL, Rfl: 0    Tirzepatide 5 MG/0.5ML solution auto-injector, " Inject 5 mg under the skin into the appropriate area as directed 1 (One) Time Per Week., Disp: 2 mL, Rfl: 6    Allergies:   Allergies   Allergen Reactions    Codeine Anaphylaxis    Penicillins Unknown - High Severity, Anaphylaxis, Hives and Other (See Comments)       Immunizations:  Health Maintenance Summary            Current Care Gaps       ANNUAL PHYSICAL (Yearly) Overdue since 8/21/2024 08/21/2023  Registry Metric: Last Annual Physical              DIABETIC FOOT EXAM (Yearly) Never done     No completion, postpone, or frequency change history exists for this topic.              Hepatitis B (1 of 3 - 19+ 3-dose series) Never done     No completion, postpone, or frequency change history exists for this topic.                      Awaiting Completion       URINE MICROALBUMIN-CREATININE RATIO (uACR) (Yearly) Order placed this encounter      07/08/2025  Order placed for Albumin/Creatinine Ratio Urine by Abbe Matta MD              Pneumococcal Vaccine 50+ (1 of 2 - PCV) Order placed this encounter      07/08/2025  Order placed for Pneumococcal Conjugate Vaccine 21-Valent All by Abbe Matta MD              HEMOGLOBIN A1C (Every 6 Months) Order placed this encounter      07/08/2025  Order placed for Hemoglobin A1c by Abbe Matta MD    03/11/2025  Hemoglobin A1C component of POC Glycosylated Hemoglobin (Hb A1C)    12/11/2024  Hemoglobin A1C component of Hemoglobin A1c    06/11/2024  Hemoglobin A1C component of Hemoglobin A1c                      Upcoming       ZOSTER VACCINE (1 of 2) Postponed until 7/22/2025 07/08/2025  Postponed until 7/22/2025 by Blossom Oreilly MA (Product Unavailable)    06/11/2024  Postponed until 6/11/2025 by Joelle Norman CMA (Product Unavailable)              HEPATITIS C SCREENING (Once) Postponed until 12/11/2025 12/11/2024  Postponed until 12/11/2025 by Hakeem Riddle MA (Patient Refused)              COVID-19 Vaccine (1 - 2024-25 season)  "Postponed until 3/10/2026      03/10/2025  Postponed until 3/10/2026 by Lian Quintero MA (Product Unavailable)    12/11/2024  Postponed until 3/2/2025 by Hakeem Riddle MA (Product Unavailable)    06/11/2024  Postponed until 6/11/2025 by Joelle Norman CMA (Product Unavailable)              INFLUENZA VACCINE (Yearly - October to March) Next due on 10/1/2025      10/23/2024  Imm Admin: Fluzone  >6mos              TDAP/TD VACCINES (2 - Td or Tdap) Next due on 11/2/2025 11/02/2015  Imm Admin: Tdap              DIABETIC EYE EXAM (Yearly) Next due on 4/8/2026 04/08/2025  EYE EXAM SCANNED    04/01/2023  SCANNED - EYE EXAM    12/28/2021  SCANNED - EYE EXAM    08/17/2020  [facility] DIABETES EYE EXAM              MAMMOGRAM (Every 2 Years) Next due on 5/20/2026 05/20/2024  MAMMO Scan    05/01/2024  Done    01/21/2022   MAMMOGRAPHY              COLORECTAL CANCER SCREENING (COLONOSCOPY - Every 5 Years) Next due on 12/11/2028 06/21/2024  Follow-up changed to COLONOSCOPY - Every 5 Years by Abbe Matat MD (Medical Decision)    12/11/2023  Colonoscopy, Scan                             Orders Placed This Encounter   Procedures    Pneumococcal Conjugate Vaccine 21-Valent All        Colorectal Screening:      Last Completed Colonoscopy            Upcoming       COLORECTAL CANCER SCREENING (COLONOSCOPY - Every 5 Years) Next due on 12/11/2028 12/11/2023  Colonoscopy, Scan                          Pap:     Last Completed Pap Smear    This patient has no relevant Health Maintenance data.        Mammogram:     Last Completed Mammogram            Upcoming       MAMMOGRAM (Every 2 Years) Next due on 5/20/2026 05/20/2024  MAMMO Scan    05/01/2024  Done    01/21/2022   MAMMOGRAPHY                               A1c:   Hemoglobin A1C   Date Value Ref Range Status   03/11/2025 5.2 4.5 - 5.7 % Final      Lipid panel:  No results found for: \"LIPIDEXCLUSI\"    The 10-year ASCVD risk score (Sukhwinder DK, et " "al., 2019) is: 4.2%    Values used to calculate the score:      Age: 59 years      Sex: Female      Is Non- : No      Diabetic: Yes      Tobacco smoker: No      Systolic Blood Pressure: 116 mmHg      Is BP treated: No      HDL Cholesterol: 42 mg/dL      Total Cholesterol: 172 mg/dL      Tobacco Use: Low Risk  (7/8/2025)    Patient History     Smoking Tobacco Use: Never     Smokeless Tobacco Use: Never     Passive Exposure: Not on file       Social History     Substance and Sexual Activity   Alcohol Use Yes    Comment: Analy socially 1 every week or 2        Social History     Substance and Sexual Activity   Drug Use Never          Objective     Physical Exam:  Vital Signs:   Vitals:    07/08/25 0818   BP: 116/72   BP Location: Left arm   Patient Position: Sitting   Cuff Size: Large Adult   Pulse: 74   SpO2: 98%   Weight: 80.7 kg (178 lb)   Height: 165.1 cm (65\")     Facility age limit for growth %sakina is 20 years.  Body mass index is 29.62 kg/m².     Physical Exam  Vitals and nursing note reviewed.   Constitutional:       General: She is not in acute distress.     Appearance: Normal appearance. She is not ill-appearing or toxic-appearing.   HENT:      Head: Normocephalic and atraumatic.      Right Ear: A middle ear effusion is present.      Left Ear: A middle ear effusion is present.   Neck:      Thyroid: No thyroid mass, thyromegaly or thyroid tenderness.   Cardiovascular:      Rate and Rhythm: Normal rate and regular rhythm.   Pulmonary:      Effort: Pulmonary effort is normal.      Breath sounds: Normal breath sounds.   Musculoskeletal:      Cervical back: Full passive range of motion without pain and neck supple. No edema.   Lymphadenopathy:      Cervical: No cervical adenopathy.   Neurological:      General: No focal deficit present.      Mental Status: She is alert.      Cranial Nerves: Cranial nerves 2-12 are intact.   Psychiatric:         Attention and Perception: Attention " normal.         Mood and Affect: Mood normal.         Speech: Speech normal.         POCT Results (if applicable);   Results for orders placed or performed in visit on 03/11/25   POC Glycosylated Hemoglobin (Hb A1C)    Collection Time: 03/11/25  8:14 AM    Specimen: Blood   Result Value Ref Range    Hemoglobin A1C 5.2 4.5 - 5.7 %    Lot Number [id number]     Expiration Date 11,192,026         Procedures    Assessment / Plan      Assessment/Plan:   Assessment & Plan  Annual physical exam    Orders:    CBC & Differential    Comprehensive Metabolic Panel    Hemoglobin A1c    Lipid Panel    TSH    Type 2 diabetes mellitus without complication, without long-term current use of insulin    Patient tolerated tirzepatide better than semaglutide.  Will attempt to get tirzepatide approved through patient's insurance.  If not, we will continue semaglutide.  Patient is asking to increase dose if she stays on semaglutide.    Orders:    Tirzepatide 5 MG/0.5ML solution auto-injector; Inject 5 mg under the skin into the appropriate area as directed 1 (One) Time Per Week.    Hemoglobin A1c    Albumin/Creatinine Ratio Urine    Need for Streptococcus pneumoniae vaccination    Orders:    Pneumococcal Conjugate Vaccine 21-Valent All    Thyroid disorder screen    Orders:    TSH    Major depressive disorder, recurrent, moderate  Stable.  Continue current management.    Orders:    PARoxetine (PAXIL) 20 MG tablet; Take 3 tablets by mouth Daily.    Gastroesophageal reflux disease without esophagitis    Orders:    omeprazole (priLOSEC) 40 MG capsule; Take 1 capsule by mouth Every Morning Before Breakfast.    Right acute serous otitis media, recurrence not specified  Recommend over-the-counter antihistamine.  Patient does have a prescription of Flonase.  She has not been using this regularly.  I recommend daily use.  Follow-up if symptoms persist.       Neck swelling  I see no abnormality on exam today.  Thyroid exam is essentially normal.  I  discussed the concern of the coworker with the patient.  I suspect what she is saying is more prominent sternocleidomastoid muscles given patient's recent weight loss.  I offered ultrasound but patient declined.            Healthcare Maintenance:  Counseling provided based on age appropriate USPSTF guidelines.       Margot Sanchez voices understanding and acceptance of this advice and will call back with any further questions or concerns. AVS with preventive healthcare tips printed for patient.     Vaccine Counseling:      Follow Up:   No follow-ups on file.      Lorena Matta MD  [facility] [place]

## 2025-07-09 ENCOUNTER — RESULTS FOLLOW-UP (OUTPATIENT)
Dept: FAMILY MEDICINE CLINIC | Facility: CLINIC | Age: 60
End: 2025-07-09
Payer: COMMERCIAL

## 2025-07-09 ENCOUNTER — TELEPHONE (OUTPATIENT)
Dept: FAMILY MEDICINE CLINIC | Facility: CLINIC | Age: 60
End: 2025-07-09
Payer: COMMERCIAL

## 2025-07-09 LAB
ALBUMIN SERPL-MCNC: 4.2 G/DL (ref 3.5–5.2)
ALBUMIN/GLOB SERPL: 1.4 G/DL
ALP SERPL-CCNC: 95 U/L (ref 39–117)
ALT SERPL-CCNC: 12 U/L (ref 1–33)
AST SERPL-CCNC: 19 U/L (ref 1–32)
BASOPHILS # BLD AUTO: 0.04 10*3/MM3 (ref 0–0.2)
BASOPHILS NFR BLD AUTO: 0.6 % (ref 0–1.5)
BILIRUB SERPL-MCNC: 0.3 MG/DL (ref 0–1.2)
BUN SERPL-MCNC: 13 MG/DL (ref 6–20)
BUN/CREAT SERPL: 15.5 (ref 7–25)
CALCIUM SERPL-MCNC: 9.2 MG/DL (ref 8.6–10.5)
CHLORIDE SERPL-SCNC: 106 MMOL/L (ref 98–107)
CHOLEST SERPL-MCNC: 168 MG/DL (ref 0–200)
CO2 SERPL-SCNC: 22.9 MMOL/L (ref 22–29)
CREAT SERPL-MCNC: 0.84 MG/DL (ref 0.57–1)
EGFRCR SERPLBLD CKD-EPI 2021: 81.2 ML/MIN/1.73
EOSINOPHIL # BLD AUTO: 0.11 10*3/MM3 (ref 0–0.4)
EOSINOPHIL NFR BLD AUTO: 1.6 % (ref 0.3–6.2)
ERYTHROCYTE [DISTWIDTH] IN BLOOD BY AUTOMATED COUNT: 14.2 % (ref 12.3–15.4)
GLOBULIN SER CALC-MCNC: 2.9 GM/DL
GLUCOSE SERPL-MCNC: 99 MG/DL (ref 65–99)
HBA1C MFR BLD: 5.2 % (ref 4.8–5.6)
HCT VFR BLD AUTO: 39.4 % (ref 34–46.6)
HDLC SERPL-MCNC: 37 MG/DL (ref 40–60)
HGB BLD-MCNC: 12.3 G/DL (ref 12–15.9)
IMM GRANULOCYTES # BLD AUTO: 0.02 10*3/MM3 (ref 0–0.05)
IMM GRANULOCYTES NFR BLD AUTO: 0.3 % (ref 0–0.5)
LDLC SERPL CALC-MCNC: 113 MG/DL (ref 0–100)
LYMPHOCYTES # BLD AUTO: 1.94 10*3/MM3 (ref 0.7–3.1)
LYMPHOCYTES NFR BLD AUTO: 27.4 % (ref 19.6–45.3)
MCH RBC QN AUTO: 25.9 PG (ref 26.6–33)
MCHC RBC AUTO-ENTMCNC: 31.2 G/DL (ref 31.5–35.7)
MCV RBC AUTO: 83.1 FL (ref 79–97)
MONOCYTES # BLD AUTO: 0.36 10*3/MM3 (ref 0.1–0.9)
MONOCYTES NFR BLD AUTO: 5.1 % (ref 5–12)
NEUTROPHILS # BLD AUTO: 4.61 10*3/MM3 (ref 1.7–7)
NEUTROPHILS NFR BLD AUTO: 65 % (ref 42.7–76)
NRBC BLD AUTO-RTO: 0 /100 WBC (ref 0–0.2)
PLATELET # BLD AUTO: 280 10*3/MM3 (ref 140–450)
POTASSIUM SERPL-SCNC: 4.3 MMOL/L (ref 3.5–5.2)
PROT SERPL-MCNC: 7.1 G/DL (ref 6–8.5)
RBC # BLD AUTO: 4.74 10*6/MM3 (ref 3.77–5.28)
SODIUM SERPL-SCNC: 141 MMOL/L (ref 136–145)
TRIGL SERPL-MCNC: 96 MG/DL (ref 0–150)
TSH SERPL DL<=0.005 MIU/L-ACNC: 1.67 UIU/ML (ref 0.27–4.2)
VLDLC SERPL CALC-MCNC: 18 MG/DL (ref 5–40)
WBC # BLD AUTO: 7.08 10*3/MM3 (ref 3.4–10.8)

## 2025-07-09 NOTE — TELEPHONE ENCOUNTER
"Pt's pcp states:    \"Your cholesterol is borderline abnormal.  Other labs are essentially normal.  I would not recommend medication for cholesterol at this time.  Recommend a low-fat/low-cholesterol diet and increase omega-3 fatty acids.\"    Pt verbally understood.  "